# Patient Record
(demographics unavailable — no encounter records)

---

## 2025-03-14 NOTE — HISTORY OF PRESENT ILLNESS
[de-identified] : Congestion and cough [FreeTextEntry6] : Congestion with cough, no fevers, started yesterday and coughingmore today   No sore throat, no respiratory distress, no wheezing or dyspnea. No rashes, no lethargy, no myalgia. No abdominal pain, no vomiting or diarrhea, stooling normally. Voiding normally, eating and drinking well. No concern for dehydration.   No sick contacts. No recent travel. No other concerns at this time

## 2025-03-14 NOTE — PHYSICAL EXAM
[Tired appearing] : not tired appearing [Consolable] : consolable [Playful] : playful [Toxic] : not toxic [Erythema] : no erythema [Wheezing] : no wheezing [Rales] : no rales [Crackles] : no crackles [Transmitted Upper Airway Sounds] : no transmitted upper airway sounds [Tachypnea] : no tachypnea [Rhonchi] : no rhonchi [NL] : warm, clear [FreeTextEntry7] : no barking cough or croup sounding cough

## 2025-03-14 NOTE — HISTORY OF PRESENT ILLNESS
[de-identified] : Congestion and cough [FreeTextEntry6] : Congestion with cough, no fevers, started yesterday and coughingmore today   No sore throat, no respiratory distress, no wheezing or dyspnea. No rashes, no lethargy, no myalgia. No abdominal pain, no vomiting or diarrhea, stooling normally. Voiding normally, eating and drinking well. No concern for dehydration.   No sick contacts. No recent travel. No other concerns at this time

## 2025-04-25 NOTE — DISCUSSION/SUMMARY
[Normal Growth] : growth [Normal Development] : development [None] : No known medical problems [No Elimination Concerns] : elimination [No Feeding Concerns] : feeding [No Skin Concerns] : skin [Normal Sleep Pattern] : sleep [Family Support] : family support [Encouraging Literacy Activities] : encouraging literacy activities [Playing with Peers] : playing with peers [Promoting Physical Activity] : promoting physical activity [Safety] : safety [No Medications] : ~He/She~ is not on any medications [Parent/Guardian] : parent/guardian [FreeTextEntry1] : FAMILY SUPPORT: Discussed family support- family decisions, sibling rivalry, work balance.  LITERACY ACTIVITIES: Discussed encouraging literacy activities - singing, talking, describing,  PLAYING WITH PEERS: Discussed  interactive games, play opportunities.  PHYSICAL ACTIVITY: Discussed promoting physical activity (limits on physical activity).  DENTAL: Discussed visit with dentist.  TOILET TRAINING:  Child is toilet trained during the daytime for both bowel and bladder.   SAFETY: Discussed car safety seats, pedestrian safety, falls from windows, guns, poisons. Smoke    and carbon monoxide monitors stressed. Lead exposure discussed. Dental and lead questionnaire reviewed, NO issues. 5-2-1-0 questionnaire reviewed, parent(s) have no issues or concerns. Discussed in the preferred language of English Will monitor development for now once starts more therapy at  - if not improving but has no further diagnoses made, consider developmental evaluation Monitor tonsils for now

## 2025-04-25 NOTE — PHYSICAL EXAM
[Alert] : alert [No Acute Distress] : no acute distress [Playful] : playful [Normocephalic] : normocephalic [Conjunctivae with no discharge] : conjunctivae with no discharge [PERRL] : PERRL [EOMI Bilateral] : EOMI bilateral [Auricles Well Formed] : auricles well formed [Clear Tympanic membranes with present light reflex and bony landmarks] : clear tympanic membranes with present light reflex and bony landmarks [No Discharge] : no discharge [Nares Patent] : nares patent [Pink Nasal Mucosa] : pink nasal mucosa [Palate Intact] : palate intact [Uvula Midline] : uvula midline [Nonerythematous Oropharynx] : nonerythematous oropharynx [No Caries] : no caries [Trachea Midline] : trachea midline [Supple, full passive range of motion] : supple, full passive range of motion [No Palpable Masses] : no palpable masses [Symmetric Chest Rise] : symmetric chest rise [Clear to Auscultation Bilaterally] : clear to auscultation bilaterally [Normoactive Precordium] : normoactive precordium [Regular Rate and Rhythm] : regular rate and rhythm [Normal S1, S2 present] : normal S1, S2 present [No Murmurs] : no murmurs [+2 Femoral Pulses] : +2 femoral pulses [Soft] : soft [NonTender] : non tender [Non Distended] : non distended [Normoactive Bowel Sounds] : normoactive bowel sounds [No Hepatomegaly] : no hepatomegaly [No Splenomegaly] : no splenomegaly [Tyler 1] : Tyler 1 [Central Urethral Opening] : central urethral opening [Testicles Descended Bilaterally] : testicles descended bilaterally [No Abnormal Lymph Nodes Palpated] : no abnormal lymph nodes palpated [Symmetric Buttocks Creases] : symmetric buttocks creases [Symmetric Hip Rotation] : symmetric hip rotation [No Gait Asymmetry] : no gait asymmetry [No pain or deformities with palpation of bone, muscles, joints] : no pain or deformities with palpation of bone, muscles, joints [Normal Muscle Tone] : normal muscle tone [No Spinal Dimple] : no spinal dimple [NoTuft of Hair] : no tuft of hair [Straight] : straight [+2 Patella DTR] : +2 patella DTR [Cranial Nerves Grossly Intact] : cranial nerves grossly intact [No Rash or Lesions] : no rash or lesions [de-identified] : tonsils 1-2+

## 2025-04-25 NOTE — DEVELOPMENTAL MILESTONES
[Yes: _______] : yes, [unfilled] [FreeTextEntry1] : GM - 2-4 FMA - 3-2 PS - 2-6 L - 2-6 (points to pictures, but no real words)

## 2025-04-25 NOTE — HISTORY OF PRESENT ILLNESS
[Normal] : Normal [Brushing teeth] : Brushing teeth [Vitamin] : Primary Fluoride Source: Vitamin [No] : No cigarette smoke exposure [Water heater temperature set at <120 degrees F] : Water heater temperature set at <120 degrees F [Car seat in back seat] : Car seat in back seat [Smoke Detectors] : Smoke detectors [Supervised play near cars and streets] : Supervised play near cars and streets [Carbon Monoxide Detectors] : Carbon monoxide detectors [Mother] : mother [In bed] : In bed [Up to date] : Up to date [FreeTextEntry7] : 3 YR WELL [de-identified] : healthy diet [FreeTextEntry3] : no snoring [LastFluorideTreatment] : n/a [FreeTextEntry9] : getting ST/OT  [FreeTextEntry1] : getting speech privately twice a week and early intervention once a week, getting OT and seeit through early intervention.  Was diagnosed as preschooler with disability and qualified for  next year with services.  Starting to interact/socialize with other children at .  Still not really saying any words but babbling a lot.   Good receptive speech.  Mom unsure about autism as he has definitely improved since starting  and hopes that it will continue to improve once starting school 5 days a week and more intensive therapy.    saw audiology at [facility] - hearing was normal.  Still very speech delayed in expressive speech and mom will be seeing [facility]iology for second opinion  still gets on and off eczema flares - uses hydrocortisone 2.5 % as needed